# Patient Record
Sex: MALE | Race: WHITE | NOT HISPANIC OR LATINO | Employment: FULL TIME | URBAN - METROPOLITAN AREA
[De-identification: names, ages, dates, MRNs, and addresses within clinical notes are randomized per-mention and may not be internally consistent; named-entity substitution may affect disease eponyms.]

---

## 2023-10-11 NOTE — PROGRESS NOTES
Subjective     Patient ID: Noe Murrell is a 38 y.o. male.    Chief Complaint: Ringing in ears      HPI  Patient reports ringing in both ears for the last 1.5 weeks. He states the ringing began after a music festival where he did wear earplugs. He reports the ringing does worsen with movement of the head. He states the ringing is interfering with sleep. He reports he has been also having neck tension and pain that has been improving with stretching.    Review of Systems   Constitutional:  Negative for chills and fatigue.   HENT:  Positive for tinnitus.    Respiratory:  Negative for chest tightness and shortness of breath.    Cardiovascular:  Negative for chest pain and palpitations.   Gastrointestinal:  Negative for abdominal pain, constipation, diarrhea, nausea and vomiting.   Genitourinary:  Negative for frequency and urgency.   Musculoskeletal:  Positive for neck pain.        Neck tension   Neurological:  Negative for dizziness, numbness and headaches.          Objective     Physical Exam  Constitutional:       General: He is not in acute distress.     Appearance: Normal appearance. He is normal weight.   HENT:      Head: Normocephalic and atraumatic.      Right Ear: Tympanic membrane normal.      Left Ear: Tympanic membrane normal.   Cardiovascular:      Rate and Rhythm: Normal rate and regular rhythm.      Heart sounds: Normal heart sounds. No murmur heard.     No friction rub. No gallop.   Pulmonary:      Effort: Pulmonary effort is normal.      Breath sounds: Normal breath sounds. No wheezing, rhonchi or rales.   Abdominal:      General: Bowel sounds are normal. There is no distension.      Palpations: Abdomen is soft.      Tenderness: There is no abdominal tenderness. There is no rebound.   Skin:     General: Skin is warm and dry.      Coloration: Skin is not jaundiced.   Neurological:      General: No focal deficit present.      Mental Status: He is alert and oriented to person, place, and time. Mental  status is at baseline.   Psychiatric:         Mood and Affect: Mood normal.         Behavior: Behavior normal.            Assessment and Plan     1. Tinnitus of both ears  -     Ambulatory referral/consult to ENT; Future; Expected date: 10/19/2023  Acute. Patient advised to take antihistamines. Will refer to ENT for further management.   2. Neck pain  Acute. Patient advised to continue stretching exercises. Patient advised to use Voltaren gel as it is an NSAID             No follow-ups on file.

## 2023-10-12 ENCOUNTER — OFFICE VISIT (OUTPATIENT)
Dept: FAMILY MEDICINE | Facility: CLINIC | Age: 38
End: 2023-10-12
Payer: COMMERCIAL

## 2023-10-12 VITALS
BODY MASS INDEX: 23.68 KG/M2 | SYSTOLIC BLOOD PRESSURE: 112 MMHG | HEIGHT: 70 IN | OXYGEN SATURATION: 98 % | TEMPERATURE: 97 F | RESPIRATION RATE: 18 BRPM | WEIGHT: 165.38 LBS | DIASTOLIC BLOOD PRESSURE: 70 MMHG | HEART RATE: 97 BPM

## 2023-10-12 DIAGNOSIS — M54.2 NECK PAIN: ICD-10-CM

## 2023-10-12 DIAGNOSIS — H93.13 TINNITUS OF BOTH EARS: Primary | ICD-10-CM

## 2023-10-12 PROCEDURE — 3008F PR BODY MASS INDEX (BMI) DOCUMENTED: ICD-10-PCS | Mod: CPTII,S$GLB,, | Performed by: INTERNAL MEDICINE

## 2023-10-12 PROCEDURE — 99999 PR PBB SHADOW E&M-NEW PATIENT-LVL III: ICD-10-PCS | Mod: PBBFAC,,, | Performed by: INTERNAL MEDICINE

## 2023-10-12 PROCEDURE — 3008F BODY MASS INDEX DOCD: CPT | Mod: CPTII,S$GLB,, | Performed by: INTERNAL MEDICINE

## 2023-10-12 PROCEDURE — 99999 PR PBB SHADOW E&M-NEW PATIENT-LVL III: CPT | Mod: PBBFAC,,, | Performed by: INTERNAL MEDICINE

## 2023-10-12 PROCEDURE — 3078F PR MOST RECENT DIASTOLIC BLOOD PRESSURE < 80 MM HG: ICD-10-PCS | Mod: CPTII,S$GLB,, | Performed by: INTERNAL MEDICINE

## 2023-10-12 PROCEDURE — 99202 OFFICE O/P NEW SF 15 MIN: CPT | Mod: S$GLB,,, | Performed by: INTERNAL MEDICINE

## 2023-10-12 PROCEDURE — 3078F DIAST BP <80 MM HG: CPT | Mod: CPTII,S$GLB,, | Performed by: INTERNAL MEDICINE

## 2023-10-12 PROCEDURE — 3074F SYST BP LT 130 MM HG: CPT | Mod: CPTII,S$GLB,, | Performed by: INTERNAL MEDICINE

## 2023-10-12 PROCEDURE — 3074F PR MOST RECENT SYSTOLIC BLOOD PRESSURE < 130 MM HG: ICD-10-PCS | Mod: CPTII,S$GLB,, | Performed by: INTERNAL MEDICINE

## 2023-10-12 PROCEDURE — 99202 PR OFFICE/OUTPT VISIT, NEW, LEVL II, 15-29 MIN: ICD-10-PCS | Mod: S$GLB,,, | Performed by: INTERNAL MEDICINE

## 2023-10-19 ENCOUNTER — OFFICE VISIT (OUTPATIENT)
Dept: OTOLARYNGOLOGY | Facility: CLINIC | Age: 38
End: 2023-10-19
Payer: COMMERCIAL

## 2023-10-19 DIAGNOSIS — H93.13 TINNITUS OF BOTH EARS: ICD-10-CM

## 2023-10-19 PROCEDURE — 1159F PR MEDICATION LIST DOCUMENTED IN MEDICAL RECORD: ICD-10-PCS | Mod: CPTII,S$GLB,, | Performed by: STUDENT IN AN ORGANIZED HEALTH CARE EDUCATION/TRAINING PROGRAM

## 2023-10-19 PROCEDURE — 1159F MED LIST DOCD IN RCRD: CPT | Mod: CPTII,S$GLB,, | Performed by: STUDENT IN AN ORGANIZED HEALTH CARE EDUCATION/TRAINING PROGRAM

## 2023-10-19 PROCEDURE — 99999 PR PBB SHADOW E&M-EST. PATIENT-LVL II: ICD-10-PCS | Mod: PBBFAC,,, | Performed by: STUDENT IN AN ORGANIZED HEALTH CARE EDUCATION/TRAINING PROGRAM

## 2023-10-19 PROCEDURE — 99999 PR PBB SHADOW E&M-EST. PATIENT-LVL II: CPT | Mod: PBBFAC,,, | Performed by: STUDENT IN AN ORGANIZED HEALTH CARE EDUCATION/TRAINING PROGRAM

## 2023-10-19 PROCEDURE — 99203 OFFICE O/P NEW LOW 30 MIN: CPT | Mod: S$GLB,,, | Performed by: STUDENT IN AN ORGANIZED HEALTH CARE EDUCATION/TRAINING PROGRAM

## 2023-10-19 PROCEDURE — 99203 PR OFFICE/OUTPT VISIT, NEW, LEVL III, 30-44 MIN: ICD-10-PCS | Mod: S$GLB,,, | Performed by: STUDENT IN AN ORGANIZED HEALTH CARE EDUCATION/TRAINING PROGRAM

## 2023-10-19 NOTE — PROGRESS NOTES
Chief complaint:   Chief Complaint   Patient presents with    Tinnitus     Onset 4 weeks ago, high frequency in time with his heartbeat. Changes with head movement.          Referring Provider:  Orquidea Carrasco,   [id number] [place] [address]    History of Present Illness:     Mr. Murrell is a 38 y.o. male presenting for evaluation of tinnitus.     Patient presents with tinnitus. Onset of symptoms was abrupt starting 4 weeks ago ago with gradually worsening course since that time. Started after a music festival. Did use hearing protection.      Patient describes the tinnitus as stable located in the bilateral ear. The quality is described as high pitch. The pattern is continuous, but sometimes pulsatile with an intensity that is medium. Can be worse with anxiety. Patient describes his level of annoyance as minimally annoying, intermittently aware. Associated symptoms include no hearing loss, pain, dizziness, drainage or recurrent otitis. Previous treatments include none.      History        Past Medical History: No past medical history on file. .          Past Surgical History:No past surgical history on file..         Medications: Medication list was reviewed. He  currently has no medications in their medication list.         Allergies: Review of patient's allergies indicates:  No Known Allergies         Family history: family history is not on file.         Social History          Alcohol use:  has no history on file for alcohol use.            Tobacco:  reports that he has never smoked. He has never been exposed to tobacco smoke. He has never used smokeless tobacco.         Please see the patient's intake form for full details of past medical history, past surgical history, family history, social history and review of systems. ?This information was reviewed by me and noted.      Physical Examination     General: Well developed, well nourished, well hydrated. Verbal with a strong voice and not  dysphonic.     Head/Face: Normocephalic, atraumatic. No scars or lesions. Facial musculature equal.     Eyes: No scleral icterus or conjunctival hemorrhage. EOMI. PERRLA.     Ears:     Right ear: No gross deformity. EAC is clear of debris and erythema. The TM is intact with a pneumatized middle ear. No signs of retraction, fluid or infection.      Left ear: No gross deformity. EAC is clear of debris and erythema. The TM is intact with a pneumatized middle ear. No signs of retraction, fluid or infection.       Neurologic: Moving all extremities without gross abnormality.CN II-XII grossly intact. House-Brackmann 1/6. No signs of nystagmus.      Psych: Alert and oriented to person, place, and time with an appropriate mood and affect.         Assessment     1. Tinnitus of both ears        Plan:      The diagnosis and options of management were discussed at length with the patient including hearing function, hearing conservation, and tinnitus management. W    We discussed that the tinnitus is likely related to noise-induced trauma/hearing loss, and may be in the high frequencies above our typical range of testing.     Some preventative measures to help prevent and minimize tinnitus including:     Reduce exposure to extremely loud noise  Avoid total silence  Decrease salt intake  Monitor one's blood pressure  Avoid stimulants such as caffeine and nicotine  Exercise  Reduce fatigue  Manage stress    We also discussed it is likely too late for treatment with steroids at this point. Risks of side effects would likely outweigh benefits. We also discussed that his tinnitus may still improve over the course the next several weeks to months. We will plan for audiogram at his convenience - and if possible with testing of high fz hearing above 8k.            Dylan Ziegler MD  [facility]   [facility] [address]  [place] [address]  P: [phone]  F: [phone]

## 2025-02-11 ENCOUNTER — OFFICE VISIT (OUTPATIENT)
Dept: OTOLARYNGOLOGY | Facility: CLINIC | Age: 40
End: 2025-02-11
Payer: COMMERCIAL

## 2025-02-11 VITALS — BODY MASS INDEX: 23.68 KG/M2 | WEIGHT: 165.38 LBS | HEIGHT: 70 IN

## 2025-02-11 DIAGNOSIS — H93.A3 PULSATILE TINNITUS, BILATERAL: Primary | ICD-10-CM

## 2025-02-11 DIAGNOSIS — K21.9 GASTROESOPHAGEAL REFLUX DISEASE, UNSPECIFIED WHETHER ESOPHAGITIS PRESENT: ICD-10-CM

## 2025-02-11 PROCEDURE — 1159F MED LIST DOCD IN RCRD: CPT | Mod: CPTII,S$GLB,, | Performed by: STUDENT IN AN ORGANIZED HEALTH CARE EDUCATION/TRAINING PROGRAM

## 2025-02-11 PROCEDURE — 3008F BODY MASS INDEX DOCD: CPT | Mod: CPTII,S$GLB,, | Performed by: STUDENT IN AN ORGANIZED HEALTH CARE EDUCATION/TRAINING PROGRAM

## 2025-02-11 PROCEDURE — 99999 PR PBB SHADOW E&M-EST. PATIENT-LVL II: CPT | Mod: PBBFAC,,, | Performed by: STUDENT IN AN ORGANIZED HEALTH CARE EDUCATION/TRAINING PROGRAM

## 2025-02-11 PROCEDURE — 99214 OFFICE O/P EST MOD 30 MIN: CPT | Mod: S$GLB,,, | Performed by: STUDENT IN AN ORGANIZED HEALTH CARE EDUCATION/TRAINING PROGRAM

## 2025-02-11 NOTE — PROGRESS NOTES
Chief complaint:   Chief Complaint   Patient presents with    Tinnitus     Pt states ringing in both ears has gotten worse in the past two weeks           Referring Provider:  Self, Aaareferral  No address on file    History of Present Illness:     Mr. Murrell is a 40 y.o. male presenting for evaluation of tinnitus.     Patient presents with tinnitus. Onset of symptoms was abrupt starting 4 weeks ago ago with gradually worsening course since that time. Started after a music festival. Did use hearing protection.      Patient describes the tinnitus as stable located in the bilateral ear. The quality is described as high pitch. The pattern is continuous, but sometimes pulsatile with an intensity that is medium. Can be worse with anxiety. Patient describes his level of annoyance as minimally annoying, intermittently aware. Associated symptoms include no hearing loss, pain, dizziness, drainage or recurrent otitis. Previous treatments include none.    Update 2/11/25    Presents today with bilateral PT, progressively worsening over the last 2 weeks  Started 1.5 years ago after a concert (he is not sure if that was related, but it was a very stressful event)  PT described as matching HR, but sounds high pitched, not whooshing  No double vision, lightheadedness, vertigo,     He does have occasional left frontal headaches, but no change.     He has noted increased stress  He does have acid reflux as well, felt it flare recently, and tried prilosec  He denies HTN.     Previously seen in ED 8/24 with visual change and had MRI at that time. Was not associated with headaches or PT at that time. Had LP at that time which was normal.       History        Past Medical History: No past medical history on file. .          Past Surgical History:No past surgical history on file..         Medications: Medication list was reviewed. He  currently has no medications in their medication list.         Allergies: Review of patient's allergies  indicates:  No Known Allergies         Family history: family history is not on file.         Social History          Alcohol use:  has no history on file for alcohol use.            Tobacco:  reports that he has never smoked. He has never been exposed to tobacco smoke. He has never used smokeless tobacco.         Please see the patient's intake form for full details of past medical history, past surgical history, family history, social history and review of systems. ?This information was reviewed by me and noted.      Physical Examination     General: Well developed, well nourished, well hydrated. Verbal with a strong voice and not dysphonic.     Head/Face: Normocephalic, atraumatic. No scars or lesions. Facial musculature equal.     Eyes: No scleral icterus or conjunctival hemorrhage. EOMI. PERRLA.     Ears:     Right ear: No gross deformity. EAC is clear of debris and erythema. The TM is intact with a pneumatized middle ear. No signs of retraction, fluid or infection.      Left ear: No gross deformity. EAC is clear of debris and erythema. The TM is intact with a pneumatized middle ear. No signs of retraction, fluid or infection.       Neurologic: Moving all extremities without gross abnormality.CN II-XII grossly intact. House-Brackmann 1/6. No signs of nystagmus.      Psych: Alert and oriented to person, place, and time with an appropriate mood and affect.         MRI 8/21/24 for papilledema  MRI BRAIN:     Signal intensity of the cerebral parenchyma is normal. There is no encephalomalacia or obvious cerebral cortical atrophy. The lateral ventricles are midline in position and normal in size. No acute intracranial hemorrhage, edema, mass effect, or midline shift is noted. There is no evidence of any diffusion restriction to suggest any acute intracranial ischemia. There is no abnormal intracranial enhancement. No abnormal susceptibility artifact is seen.     The corpus callosum and pituitary gland are  unremarkable. There is no evidence of any cerebellar tonsillar ectopia.     Normal signal void is seen in the mastoid antra and middle ear cavities.  The visualized paranasal sinuses are clear. There is mild leftward bowing of the bony nasal septum. There is mild edema in the left-sided nasal turbinates.     Although specific MR imaging of the orbits was not performed, there is no evidence of any orbital proptosis. The optic globes and retrobulbar structures appear intact. The optic nerves are generally symmetrical. There is no increased cerebrospinal fluid noted along the optic nerve sheaths.     MRI VENOGRAPHY OF THE DURAL VENOUS SINUSES:     The superior sagittal sinus is patent and normal. There is normal signal within the bilateral transverse and sigmoid sinuses, which are generally symmetrical, only slightly larger on the right. The right internal jugular vein is slightly larger than the left internal jugular vein. The straight sinus and vein of Dash are intact and normal. No abnormal filling defects are seen within the dural venous sinuses.   Assessment     1. Pulsatile tinnitus, bilateral    2. Gastroesophageal reflux disease, unspecified whether esophagitis present          Plan:      Bilateral PT - no concerning CV symptoms, no HTN, no IIH symptoms and negative imaging in August.   I would recommend audio; if there is conductive loss we would like plan for CTA; if there is asymmetric SN loss then MRI IAC  If symmetric, normal to mild Sensorineural Hearing Loss then continue conservative measures for tinnitus        Dylan Ziegler MD  [facility]   [facility] [place] [address]  [place] [id number]  P: [phone]  F: [phone]

## 2025-03-11 ENCOUNTER — CLINICAL SUPPORT (OUTPATIENT)
Dept: AUDIOLOGY | Facility: CLINIC | Age: 40
End: 2025-03-11
Payer: COMMERCIAL

## 2025-03-11 DIAGNOSIS — H93.A3 PULSATILE TINNITUS OF BOTH EARS: Primary | ICD-10-CM

## 2025-03-11 PROCEDURE — 92567 TYMPANOMETRY: CPT | Mod: S$GLB,,, | Performed by: AUDIOLOGIST

## 2025-03-11 PROCEDURE — 92557 COMPREHENSIVE HEARING TEST: CPT | Mod: S$GLB,,, | Performed by: AUDIOLOGIST

## 2025-03-11 PROCEDURE — 99999 PR PBB SHADOW E&M-EST. PATIENT-LVL I: CPT | Mod: PBBFAC,,, | Performed by: AUDIOLOGIST

## 2025-03-11 NOTE — PROGRESS NOTES
Noe Murrell was seen 03/11/2025 for an audiological evaluation. Patient complains of constant bilateral high frequency pulsatile tinnitus. It changes in loudness with changes in head or jaw movement. Onset of symptoms was about a year and a half ago after a music festival. He reports that his hearing is the same but the left ear may be slightly the poorer hearing ear. He denies any dizziness.     Results reveal normal hearing sensitivity 250-8000 Hz bilaterally.  Speech Reception Thresholds were  10 dBHL for the right ear and 10 dBHL for the left ear.   Word recognition scores were excellent bilaterally.  Tympanograms were Type A, normal for the right ear and Type A, normal for the left ear.    Patient was counseled on the above findings.    We discussed that tinnitus is most often caused by a hearing loss, and that as the hair cells are damaged, either genetic or as a result of loud noise exposure, they then cause tinnitus. Some patients find that restricting the salt or caffeine in their diet helps, and there is also an OTC supplement, lipflavinoids, that some people find to be effective though their benefit is not fully proven. Tinnitus tends to be louder in times of stress and fatigue, and may decrease with time. Sound machines may also be an effective masking technique if needed at night.      Recommendations:  Follow-up with ENT, as needed.   Repeat audiological evaluation as needed.  Wear hearing protection devices when around loud noise.